# Patient Record
Sex: MALE | Race: BLACK OR AFRICAN AMERICAN | NOT HISPANIC OR LATINO | Employment: UNEMPLOYED | URBAN - METROPOLITAN AREA
[De-identification: names, ages, dates, MRNs, and addresses within clinical notes are randomized per-mention and may not be internally consistent; named-entity substitution may affect disease eponyms.]

---

## 2024-04-19 ENCOUNTER — HOSPITAL ENCOUNTER (EMERGENCY)
Facility: HOSPITAL | Age: 74
Discharge: HOME | End: 2024-04-20
Attending: EMERGENCY MEDICINE
Payer: COMMERCIAL

## 2024-04-19 ENCOUNTER — APPOINTMENT (OUTPATIENT)
Dept: CARDIOLOGY | Facility: HOSPITAL | Age: 74
End: 2024-04-19
Payer: COMMERCIAL

## 2024-04-19 ENCOUNTER — APPOINTMENT (OUTPATIENT)
Dept: RADIOLOGY | Facility: HOSPITAL | Age: 74
End: 2024-04-19
Payer: COMMERCIAL

## 2024-04-19 VITALS
SYSTOLIC BLOOD PRESSURE: 153 MMHG | HEIGHT: 68 IN | DIASTOLIC BLOOD PRESSURE: 73 MMHG | RESPIRATION RATE: 16 BRPM | WEIGHT: 180.56 LBS | BODY MASS INDEX: 27.36 KG/M2 | HEART RATE: 78 BPM | OXYGEN SATURATION: 98 % | TEMPERATURE: 97.7 F

## 2024-04-19 DIAGNOSIS — E86.0 DEHYDRATION: Primary | ICD-10-CM

## 2024-04-19 DIAGNOSIS — B34.9 VIRAL SYNDROME: ICD-10-CM

## 2024-04-19 LAB
ALBUMIN SERPL-MCNC: 4.2 G/DL (ref 3.5–5)
ALP BLD-CCNC: 115 U/L (ref 35–125)
ALT SERPL-CCNC: 17 U/L (ref 5–40)
ANION GAP SERPL CALC-SCNC: 10 MMOL/L
APPEARANCE UR: CLEAR
AST SERPL-CCNC: 18 U/L (ref 5–40)
BASOPHILS # BLD AUTO: 0.03 X10*3/UL (ref 0–0.1)
BASOPHILS NFR BLD AUTO: 0.5 %
BILIRUB SERPL-MCNC: 0.6 MG/DL (ref 0.1–1.2)
BILIRUB UR STRIP.AUTO-MCNC: NEGATIVE MG/DL
BUN SERPL-MCNC: 31 MG/DL (ref 8–25)
CALCIUM SERPL-MCNC: 10.2 MG/DL (ref 8.5–10.4)
CHLORIDE SERPL-SCNC: 103 MMOL/L (ref 97–107)
CO2 SERPL-SCNC: 24 MMOL/L (ref 24–31)
COLOR UR: COLORLESS
CREAT SERPL-MCNC: 2.3 MG/DL (ref 0.4–1.6)
EGFRCR SERPLBLD CKD-EPI 2021: 29 ML/MIN/1.73M*2
EOSINOPHIL # BLD AUTO: 0.41 X10*3/UL (ref 0–0.4)
EOSINOPHIL NFR BLD AUTO: 6.8 %
ERYTHROCYTE [DISTWIDTH] IN BLOOD BY AUTOMATED COUNT: 12.6 % (ref 11.5–14.5)
FLUAV RNA RESP QL NAA+PROBE: NOT DETECTED
FLUBV RNA RESP QL NAA+PROBE: NOT DETECTED
GLUCOSE SERPL-MCNC: 245 MG/DL (ref 65–99)
GLUCOSE UR STRIP.AUTO-MCNC: ABNORMAL MG/DL
HCT VFR BLD AUTO: 45.1 % (ref 41–52)
HGB BLD-MCNC: 15.5 G/DL (ref 13.5–17.5)
IMM GRANULOCYTES # BLD AUTO: 0.01 X10*3/UL (ref 0–0.5)
IMM GRANULOCYTES NFR BLD AUTO: 0.2 % (ref 0–0.9)
KETONES UR STRIP.AUTO-MCNC: NEGATIVE MG/DL
LEUKOCYTE ESTERASE UR QL STRIP.AUTO: NEGATIVE
LYMPHOCYTES # BLD AUTO: 1.38 X10*3/UL (ref 0.8–3)
LYMPHOCYTES NFR BLD AUTO: 23 %
MCH RBC QN AUTO: 31.4 PG (ref 26–34)
MCHC RBC AUTO-ENTMCNC: 34.4 G/DL (ref 32–36)
MCV RBC AUTO: 91 FL (ref 80–100)
MONOCYTES # BLD AUTO: 0.92 X10*3/UL (ref 0.05–0.8)
MONOCYTES NFR BLD AUTO: 15.3 %
NEUTROPHILS # BLD AUTO: 3.26 X10*3/UL (ref 1.6–5.5)
NEUTROPHILS NFR BLD AUTO: 54.2 %
NITRITE UR QL STRIP.AUTO: NEGATIVE
NRBC BLD-RTO: 0 /100 WBCS (ref 0–0)
PH UR STRIP.AUTO: 6.5 [PH]
PLATELET # BLD AUTO: 153 X10*3/UL (ref 150–450)
POTASSIUM SERPL-SCNC: 4.5 MMOL/L (ref 3.4–5.1)
PROT SERPL-MCNC: 8.3 G/DL (ref 5.9–7.9)
PROT UR STRIP.AUTO-MCNC: NEGATIVE MG/DL
RBC # BLD AUTO: 4.94 X10*6/UL (ref 4.5–5.9)
RBC # UR STRIP.AUTO: NEGATIVE /UL
SARS-COV-2 RNA RESP QL NAA+PROBE: NOT DETECTED
SODIUM SERPL-SCNC: 137 MMOL/L (ref 133–145)
SP GR UR STRIP.AUTO: 1.01
UROBILINOGEN UR STRIP.AUTO-MCNC: NORMAL MG/DL
WBC # BLD AUTO: 6 X10*3/UL (ref 4.4–11.3)

## 2024-04-19 PROCEDURE — 93005 ELECTROCARDIOGRAM TRACING: CPT

## 2024-04-19 PROCEDURE — 71046 X-RAY EXAM CHEST 2 VIEWS: CPT | Performed by: RADIOLOGY

## 2024-04-19 PROCEDURE — 96374 THER/PROPH/DIAG INJ IV PUSH: CPT

## 2024-04-19 PROCEDURE — 96361 HYDRATE IV INFUSION ADD-ON: CPT

## 2024-04-19 PROCEDURE — 87636 SARSCOV2 & INF A&B AMP PRB: CPT | Performed by: PHYSICIAN ASSISTANT

## 2024-04-19 PROCEDURE — 81003 URINALYSIS AUTO W/O SCOPE: CPT | Performed by: PHYSICIAN ASSISTANT

## 2024-04-19 PROCEDURE — 36415 COLL VENOUS BLD VENIPUNCTURE: CPT | Performed by: PHYSICIAN ASSISTANT

## 2024-04-19 PROCEDURE — 71046 X-RAY EXAM CHEST 2 VIEWS: CPT

## 2024-04-19 PROCEDURE — 85025 COMPLETE CBC W/AUTO DIFF WBC: CPT | Performed by: PHYSICIAN ASSISTANT

## 2024-04-19 PROCEDURE — 99284 EMERGENCY DEPT VISIT MOD MDM: CPT | Mod: 25

## 2024-04-19 PROCEDURE — 84075 ASSAY ALKALINE PHOSPHATASE: CPT | Performed by: PHYSICIAN ASSISTANT

## 2024-04-19 PROCEDURE — 2500000004 HC RX 250 GENERAL PHARMACY W/ HCPCS (ALT 636 FOR OP/ED): Performed by: PHYSICIAN ASSISTANT

## 2024-04-19 RX ORDER — ACETAMINOPHEN 325 MG/1
975 TABLET ORAL ONCE
Status: COMPLETED | OUTPATIENT
Start: 2024-04-19 | End: 2024-04-19

## 2024-04-19 RX ORDER — KETOROLAC TROMETHAMINE 30 MG/ML
15 INJECTION, SOLUTION INTRAMUSCULAR; INTRAVENOUS ONCE
Status: COMPLETED | OUTPATIENT
Start: 2024-04-19 | End: 2024-04-19

## 2024-04-19 RX ADMIN — KETOROLAC TROMETHAMINE 15 MG: 30 INJECTION, SOLUTION INTRAMUSCULAR at 20:50

## 2024-04-19 RX ADMIN — SODIUM CHLORIDE 500 ML: 900 INJECTION, SOLUTION INTRAVENOUS at 20:50

## 2024-04-19 RX ADMIN — ACETAMINOPHEN 975 MG: 325 TABLET ORAL at 20:50

## 2024-04-19 ASSESSMENT — COLUMBIA-SUICIDE SEVERITY RATING SCALE - C-SSRS
6. HAVE YOU EVER DONE ANYTHING, STARTED TO DO ANYTHING, OR PREPARED TO DO ANYTHING TO END YOUR LIFE?: NO
2. HAVE YOU ACTUALLY HAD ANY THOUGHTS OF KILLING YOURSELF?: NO
1. IN THE PAST MONTH, HAVE YOU WISHED YOU WERE DEAD OR WISHED YOU COULD GO TO SLEEP AND NOT WAKE UP?: NO

## 2024-04-19 ASSESSMENT — PAIN - FUNCTIONAL ASSESSMENT: PAIN_FUNCTIONAL_ASSESSMENT: 0-10

## 2024-04-19 ASSESSMENT — PAIN SCALES - GENERAL: PAINLEVEL_OUTOF10: 0 - NO PAIN

## 2024-04-20 LAB — HOLD SPECIMEN: NORMAL

## 2024-04-20 NOTE — DISCHARGE INSTRUCTIONS
Continue with plenty of fluids at home.  Follow-up with your primary care physician as soon as possible for repeat lab work.  Return to the ER for new or worsening symptoms

## 2024-04-20 NOTE — ED PROVIDER NOTES
HPI   Chief Complaint   Patient presents with    Flu Symptoms     Congestion, fatigue, sore throat, chills x1 week. Aox4, stable, vitals wnr. Pt recently switched meds fr4om metoprolol to carvedilol.        73-year-old male with history of hypertension diabetes chronic kidney disease presented emergency department with a chief complaint of a generalized weakness, sore throat, chills, body aches, mild cough that has been for the last week.  He had his metoprolol switched to carvedilol several weeks ago and thinks this may be related although his symptoms started a week or so after the change.  He denies chest pain or pressure.  He is well-appearing nontoxic.  He denies lightheadedness dizziness numbness weakness.  He denies fall or injury or trauma.  No other complaint.                          No data recorded                   Patient History   No past medical history on file.  No past surgical history on file.  No family history on file.  Social History     Tobacco Use    Smoking status: Not on file    Smokeless tobacco: Not on file   Substance Use Topics    Alcohol use: Not on file    Drug use: Not on file       Physical Exam   ED Triage Vitals [04/19/24 2036]   Temperature Heart Rate Respirations BP   36.5 °C (97.7 °F) 78 16 153/73      Pulse Ox Temp src Heart Rate Source Patient Position   98 % -- -- --      BP Location FiO2 (%)     -- --       Physical Exam  Vitals and nursing note reviewed.   Constitutional:       Appearance: Normal appearance.   HENT:      Head: Normocephalic.      Nose: Nose normal.      Mouth/Throat:      Mouth: Mucous membranes are moist.   Cardiovascular:      Rate and Rhythm: Normal rate and regular rhythm.   Pulmonary:      Effort: Pulmonary effort is normal.   Abdominal:      General: Abdomen is flat.      Palpations: Abdomen is soft.   Musculoskeletal:         General: Normal range of motion.      Cervical back: Normal range of motion.   Skin:     General: Skin is warm.    Neurological:      General: No focal deficit present.      Mental Status: He is alert and oriented to person, place, and time.   Psychiatric:         Mood and Affect: Mood normal.         Behavior: Behavior normal.         ED Course & MDM   ED Course as of 04/19/24 2211 Fri Apr 19, 2024 2106 EKG interpretation: Normal sinus rhythm, rate 75.  Normal axis.  No ST or T wave abnormalities. [ML]   2140 Blood Urea Nitrogen(!): 31 [JH]   2143 Creatinine(!): 2.30 [JH]      ED Course User Index  [JH] Lars Khan PA-C  [ML] Larry Barakat MD         Diagnoses as of 04/19/24 2211   Dehydration   Viral syndrome       Medical Decision Making  I have seen and evaluated this patient.  The attending physician has also seen and evaluated this patient.  Vital signs, laboratory testing and diagnostic images if applicable have been reviewed.  All laboratory and imaging is interpreted by myself unless otherwise stated.  Radiology studies are also formally interpreted by radiologist.        Labs Reviewed   CBC WITH AUTO DIFFERENTIAL - Abnormal       Result Value    WBC 6.0      nRBC 0.0      RBC 4.94      Hemoglobin 15.5      Hematocrit 45.1      MCV 91      MCH 31.4      MCHC 34.4      RDW 12.6      Platelets 153      Neutrophils % 54.2      Immature Granulocytes %, Automated 0.2      Lymphocytes % 23.0      Monocytes % 15.3      Eosinophils % 6.8      Basophils % 0.5      Neutrophils Absolute 3.26      Immature Granulocytes Absolute, Automated 0.01      Lymphocytes Absolute 1.38      Monocytes Absolute 0.92 (*)     Eosinophils Absolute 0.41 (*)     Basophils Absolute 0.03     COMPREHENSIVE METABOLIC PANEL - Abnormal    Glucose 245 (*)     Sodium 137      Potassium 4.5      Chloride 103      Bicarbonate 24      Urea Nitrogen 31 (*)     Creatinine 2.30 (*)     eGFR 29 (*)     Calcium 10.2      Albumin 4.2      Alkaline Phosphatase 115      Total Protein 8.3 (*)     AST 18      Bilirubin, Total 0.6      ALT 17      Anion Gap 10      SARS-COV-2 AND INFLUENZA A/B PCR - Normal    Flu A Result Not Detected      Flu B Result Not Detected      Coronavirus 2019, PCR Not Detected      Narrative:     This assay has received FDA Emergency Use Authorization (EUA) and  is only authorized for the duration of time that circumstances exist to justify the authorization of the emergency use of in vitro diagnostic tests for the detection of SARS-CoV-2 virus and/or diagnosis of COVID-19 infection under section 564(b)(1) of the Act, 21 U.S.C. 360bbb-3(b)(1). Testing for SARS-CoV-2 is only recommended for patients who meet current clinical and/or epidemiological criteria as defined by federal, state, or local public health directives. This assay is an in vitro diagnostic nucleic acid amplification test for the qualitative detection of SARS-CoV-2, Influenza A, and Influenza B from nasopharyngeal specimens and has been validated for use at [facility]. Negative results do not preclude COVID-19 infections or Influenza A/B infections, and should not be used as the sole basis for diagnosis, treatment, or other management decisions. If Influenza A/B and RSV PCR results are negative, testing for Parainfluenza virus, Adenovirus and Metapneumovirus is routinely performed for [facility] pediatric oncology and intensive care inpatients, and is available on other patients by placing an add-on request.    URINALYSIS WITH REFLEX CULTURE AND MICROSCOPIC    Narrative:     The following orders were created for panel order Urinalysis with Reflex Culture and Microscopic.  Procedure                               Abnormality         Status                     ---------                               -----------         ------                     Urinalysis with Reflex C...[[id number]]                                                 Extra Urine Gray Tube[[id number]]                                                         Please view results for these tests on the  individual orders.   URINALYSIS WITH REFLEX CULTURE AND MICROSCOPIC   EXTRA URINE GRAY TUBE     XR chest 2 views   Final Result   1. No acute cardiopulmonary abnormality.             Signed by: Donaldo Burton 4/19/2024 9:37 PM   Dictation workstation:   [id number]        Medications   sodium chloride 0.9 % bolus 500 mL (500 mL intravenous New Bag 4/19/24 2050)   acetaminophen (Tylenol) tablet 975 mg (975 mg oral Given 4/19/24 2050)   ketorolac (Toradol) injection 15 mg (15 mg intravenous Given 4/19/24 2050)     New Prescriptions    No medications on file         Procedure  Procedures     Lars Khan PA-C  04/19/24 0681

## 2024-06-19 LAB
ATRIAL RATE: 75 BPM
P AXIS: 49 DEGREES
P OFFSET: 193 MS
P ONSET: 139 MS
PR INTERVAL: 152 MS
Q ONSET: 215 MS
QRS COUNT: 12 BEATS
QRS DURATION: 98 MS
QT INTERVAL: 386 MS
QTC CALCULATION(BAZETT): 431 MS
QTC FREDERICIA: 415 MS
R AXIS: -19 DEGREES
T AXIS: 18 DEGREES
T OFFSET: 408 MS
VENTRICULAR RATE: 75 BPM

## 2025-03-12 ENCOUNTER — HOSPITAL ENCOUNTER (EMERGENCY)
Facility: HOSPITAL | Age: 75
Discharge: HOME | End: 2025-03-12
Payer: COMMERCIAL

## 2025-03-12 ENCOUNTER — APPOINTMENT (OUTPATIENT)
Dept: RADIOLOGY | Facility: HOSPITAL | Age: 75
End: 2025-03-12
Payer: COMMERCIAL

## 2025-03-12 ENCOUNTER — APPOINTMENT (OUTPATIENT)
Dept: CARDIOLOGY | Facility: HOSPITAL | Age: 75
End: 2025-03-12
Payer: COMMERCIAL

## 2025-03-12 VITALS
HEIGHT: 68 IN | WEIGHT: 173.72 LBS | TEMPERATURE: 98.1 F | HEART RATE: 74 BPM | DIASTOLIC BLOOD PRESSURE: 80 MMHG | BODY MASS INDEX: 26.33 KG/M2 | OXYGEN SATURATION: 98 % | RESPIRATION RATE: 16 BRPM | SYSTOLIC BLOOD PRESSURE: 157 MMHG

## 2025-03-12 DIAGNOSIS — I35.1 AORTIC EJECTION MURMUR: ICD-10-CM

## 2025-03-12 DIAGNOSIS — R53.81 MALAISE AND FATIGUE: ICD-10-CM

## 2025-03-12 DIAGNOSIS — R68.83 CHILLS: Primary | ICD-10-CM

## 2025-03-12 DIAGNOSIS — R53.83 MALAISE AND FATIGUE: ICD-10-CM

## 2025-03-12 LAB
ALBUMIN SERPL BCP-MCNC: 4.3 G/DL (ref 3.4–5)
ALP SERPL-CCNC: 88 U/L (ref 33–136)
ALT SERPL W P-5'-P-CCNC: 17 U/L (ref 10–52)
ANION GAP SERPL CALCULATED.3IONS-SCNC: 10 MMOL/L (ref 10–20)
APPEARANCE UR: CLEAR
AST SERPL W P-5'-P-CCNC: 12 U/L (ref 9–39)
BASOPHILS # BLD AUTO: 0.03 X10*3/UL (ref 0–0.1)
BASOPHILS NFR BLD AUTO: 0.4 %
BILIRUB SERPL-MCNC: 0.8 MG/DL (ref 0–1.2)
BILIRUB UR STRIP.AUTO-MCNC: NEGATIVE MG/DL
BUN SERPL-MCNC: 23 MG/DL (ref 6–23)
CALCIUM SERPL-MCNC: 10.8 MG/DL (ref 8.6–10.3)
CHLORIDE SERPL-SCNC: 105 MMOL/L (ref 98–107)
CO2 SERPL-SCNC: 28 MMOL/L (ref 21–32)
COLOR UR: ABNORMAL
CREAT SERPL-MCNC: 1.69 MG/DL (ref 0.5–1.3)
EGFRCR SERPLBLD CKD-EPI 2021: 42 ML/MIN/1.73M*2
EOSINOPHIL # BLD AUTO: 0.29 X10*3/UL (ref 0–0.4)
EOSINOPHIL NFR BLD AUTO: 4.2 %
ERYTHROCYTE [DISTWIDTH] IN BLOOD BY AUTOMATED COUNT: 12.3 % (ref 11.5–14.5)
FLUAV RNA RESP QL NAA+PROBE: NOT DETECTED
FLUBV RNA RESP QL NAA+PROBE: NOT DETECTED
GLUCOSE SERPL-MCNC: 143 MG/DL (ref 74–99)
GLUCOSE UR STRIP.AUTO-MCNC: ABNORMAL MG/DL
HCT VFR BLD AUTO: 43 % (ref 41–52)
HGB BLD-MCNC: 15 G/DL (ref 13.5–17.5)
IMM GRANULOCYTES # BLD AUTO: 0.02 X10*3/UL (ref 0–0.5)
IMM GRANULOCYTES NFR BLD AUTO: 0.3 % (ref 0–0.9)
KETONES UR STRIP.AUTO-MCNC: NEGATIVE MG/DL
LEUKOCYTE ESTERASE UR QL STRIP.AUTO: NEGATIVE
LYMPHOCYTES # BLD AUTO: 1.77 X10*3/UL (ref 0.8–3)
LYMPHOCYTES NFR BLD AUTO: 25.5 %
MCH RBC QN AUTO: 31.9 PG (ref 26–34)
MCHC RBC AUTO-ENTMCNC: 34.9 G/DL (ref 32–36)
MCV RBC AUTO: 92 FL (ref 80–100)
MONOCYTES # BLD AUTO: 0.69 X10*3/UL (ref 0.05–0.8)
MONOCYTES NFR BLD AUTO: 10 %
NEUTROPHILS # BLD AUTO: 4.13 X10*3/UL (ref 1.6–5.5)
NEUTROPHILS NFR BLD AUTO: 59.6 %
NITRITE UR QL STRIP.AUTO: NEGATIVE
NRBC BLD-RTO: 0 /100 WBCS (ref 0–0)
PH UR STRIP.AUTO: 6.5 [PH]
PLATELET # BLD AUTO: 168 X10*3/UL (ref 150–450)
POTASSIUM SERPL-SCNC: 3.8 MMOL/L (ref 3.5–5.3)
PROT SERPL-MCNC: 8.2 G/DL (ref 6.4–8.2)
PROT UR STRIP.AUTO-MCNC: ABNORMAL MG/DL
RBC # BLD AUTO: 4.7 X10*6/UL (ref 4.5–5.9)
RBC # UR STRIP.AUTO: NEGATIVE MG/DL
RBC #/AREA URNS AUTO: NORMAL /HPF
RSV RNA RESP QL NAA+PROBE: NOT DETECTED
SARS-COV-2 RNA RESP QL NAA+PROBE: NOT DETECTED
SODIUM SERPL-SCNC: 139 MMOL/L (ref 136–145)
SP GR UR STRIP.AUTO: 1.02
UROBILINOGEN UR STRIP.AUTO-MCNC: NORMAL MG/DL
WBC # BLD AUTO: 6.9 X10*3/UL (ref 4.4–11.3)
WBC #/AREA URNS AUTO: NORMAL /HPF

## 2025-03-12 PROCEDURE — 85025 COMPLETE CBC W/AUTO DIFF WBC: CPT | Performed by: PHYSICIAN ASSISTANT

## 2025-03-12 PROCEDURE — 81001 URINALYSIS AUTO W/SCOPE: CPT | Performed by: PHYSICIAN ASSISTANT

## 2025-03-12 PROCEDURE — 93005 ELECTROCARDIOGRAM TRACING: CPT

## 2025-03-12 PROCEDURE — 99285 EMERGENCY DEPT VISIT HI MDM: CPT | Mod: 25

## 2025-03-12 PROCEDURE — 71046 X-RAY EXAM CHEST 2 VIEWS: CPT

## 2025-03-12 PROCEDURE — 71046 X-RAY EXAM CHEST 2 VIEWS: CPT | Performed by: SURGERY

## 2025-03-12 PROCEDURE — 87637 SARSCOV2&INF A&B&RSV AMP PRB: CPT | Performed by: PHYSICIAN ASSISTANT

## 2025-03-12 PROCEDURE — 80053 COMPREHEN METABOLIC PANEL: CPT | Performed by: PHYSICIAN ASSISTANT

## 2025-03-12 PROCEDURE — 36415 COLL VENOUS BLD VENIPUNCTURE: CPT | Performed by: PHYSICIAN ASSISTANT

## 2025-03-12 ASSESSMENT — PAIN SCALES - GENERAL: PAINLEVEL_OUTOF10: 0 - NO PAIN

## 2025-03-12 ASSESSMENT — COLUMBIA-SUICIDE SEVERITY RATING SCALE - C-SSRS
2. HAVE YOU ACTUALLY HAD ANY THOUGHTS OF KILLING YOURSELF?: NO
6. HAVE YOU EVER DONE ANYTHING, STARTED TO DO ANYTHING, OR PREPARED TO DO ANYTHING TO END YOUR LIFE?: NO
1. IN THE PAST MONTH, HAVE YOU WISHED YOU WERE DEAD OR WISHED YOU COULD GO TO SLEEP AND NOT WAKE UP?: NO

## 2025-03-12 ASSESSMENT — PAIN - FUNCTIONAL ASSESSMENT: PAIN_FUNCTIONAL_ASSESSMENT: 0-10

## 2025-03-13 LAB
ATRIAL RATE: 68 BPM
P AXIS: 49 DEGREES
P OFFSET: 198 MS
P ONSET: 140 MS
PR INTERVAL: 148 MS
Q ONSET: 214 MS
QRS COUNT: 12 BEATS
QRS DURATION: 84 MS
QT INTERVAL: 396 MS
QTC CALCULATION(BAZETT): 421 MS
QTC FREDERICIA: 413 MS
R AXIS: -18 DEGREES
T AXIS: 23 DEGREES
T OFFSET: 412 MS
VENTRICULAR RATE: 68 BPM

## 2025-03-13 NOTE — ED PROVIDER NOTES
HPI   Chief Complaint   Patient presents with   • Chills     Pt complains of intermittent chills, HTN, sore throat for a week.  Possible covid exposure.       74-year-old male presenting emergency department with a chief complaint of hypertension, feeling chilled over the last day.  He denies chest pain shortness of breath.  He is well-appearing nontoxic afebrile.  Denies lightheadedness dizziness numbness weakness.  Denies fall or injury or trauma.  No other complaint.              Patient History   No past medical history on file.  No past surgical history on file.  No family history on file.  Social History     Tobacco Use   • Smoking status: Not on file   • Smokeless tobacco: Not on file   Substance Use Topics   • Alcohol use: Not on file   • Drug use: Not on file       Physical Exam   ED Triage Vitals [03/12/25 1903]   Temperature Heart Rate Respirations BP   36.7 °C (98.1 °F) 74 16 157/80      Pulse Ox Temp src Heart Rate Source Patient Position   98 % -- -- --      BP Location FiO2 (%)     -- --       Physical Exam  Vitals and nursing note reviewed.   Constitutional:       Appearance: Normal appearance.   HENT:      Head: Normocephalic.      Nose: Nose normal.      Mouth/Throat:      Mouth: Mucous membranes are moist.   Cardiovascular:      Rate and Rhythm: Normal rate and regular rhythm.      Pulses: Normal pulses.      Heart sounds: Normal heart sounds.      Comments: Systolic murmur  Pulmonary:      Effort: Pulmonary effort is normal.      Breath sounds: Normal breath sounds.   Abdominal:      General: Abdomen is flat.      Palpations: Abdomen is soft.   Musculoskeletal:         General: Normal range of motion.      Cervical back: Normal range of motion.   Skin:     General: Skin is warm.   Neurological:      General: No focal deficit present.      Mental Status: He is alert and oriented to person, place, and time.   Psychiatric:         Mood and Affect: Mood normal.           ED Course & MDM   ED Course  as of 03/12/25 2323   Wed Mar 12, 2025   1945 EKG on my independent interpretation: Normal sinus rhythm 68 bpm, normal axis, normal intervals, no acute ST or T wave abnormalities [JUAN FRANCISCO]      ED Course User Index  [JUAN FRANCISCO] Christen Wilson MD         Diagnoses as of 03/12/25 2323   Chills   Malaise and fatigue   Aortic ejection murmur                 No data recorded     Angélica Coma Scale Score: 15 (03/12/25 1937 : Gideon Hays RN)                           Medical Decision Making  I have seen and evaluated this patient.  Physician available for consultation.  Vital signs have been reviewed.  All laboratory and diagnostic imaging is reviewed by myself and interpreted by myself unless otherwise stated.  Additionally imaging is interpreted by radiologist.    CBC without significant leukocytosis or anemia, metabolic panel without significant renal impairment or electrolyte abnormality.  Urinalysis negative, viral testing negative.  Patient noted hypertension but otherwise normal vitals.  Released in stable condition for outpatient follow-up.          Labs Reviewed   COMPREHENSIVE METABOLIC PANEL - Abnormal       Result Value    Glucose 143 (*)     Sodium 139      Potassium 3.8      Chloride 105      Bicarbonate 28      Anion Gap 10      Urea Nitrogen 23      Creatinine 1.69 (*)     eGFR 42 (*)     Calcium 10.8 (*)     Albumin 4.3      Alkaline Phosphatase 88      Total Protein 8.2      AST 12      Bilirubin, Total 0.8      ALT 17     URINALYSIS WITH REFLEX CULTURE AND MICROSCOPIC - Abnormal    Color, Urine Light-Yellow      Appearance, Urine Clear      Specific Gravity, Urine 1.021      pH, Urine 6.5      Protein, Urine 10 (TRACE)      Glucose, Urine OVER (4+) (*)     Blood, Urine NEGATIVE      Ketones, Urine NEGATIVE      Bilirubin, Urine NEGATIVE      Urobilinogen, Urine Normal      Nitrite, Urine NEGATIVE      Leukocyte Esterase, Urine NEGATIVE      Narrative:     OVER is reported when the result is greater than  the clinically reportable range.   SARS-COV-2, INFLUENZA A/B AND RSV PCR - Normal    Coronavirus 2019, PCR Not Detected      Flu A Result Not Detected      Flu B Result Not Detected      RSV PCR Not Detected      Narrative:     This assay is an FDA-cleared, in vitro diagnostic nucleic acid amplification test for the qualitative detection and differentiation of SARS CoV-2/ Influenza A/B/ RSV from nasopharyngeal specimens collected from individuals with signs and symptoms of respiratory tract infections, and has been validated for use at [facility]. Negative results do not preclude COVID-19/ Influenza A/B/ RSV infections and should not be used as the sole basis for diagnosis, treatment, or other management decisions. Testing for SARS CoV-2 is recommended only for patients who meet current clinical and/or epidemiological criteria defined by federal, state, or local public health directives.   URINALYSIS MICROSCOPIC WITH REFLEX CULTURE - Normal    WBC, Urine NONE      RBC, Urine NONE     CBC WITH AUTO DIFFERENTIAL    WBC 6.9      nRBC 0.0      RBC 4.70      Hemoglobin 15.0      Hematocrit 43.0      MCV 92      MCH 31.9      MCHC 34.9      RDW 12.3      Platelets 168      Neutrophils % 59.6      Immature Granulocytes %, Automated 0.3      Lymphocytes % 25.5      Monocytes % 10.0      Eosinophils % 4.2      Basophils % 0.4      Neutrophils Absolute 4.13      Immature Granulocytes Absolute, Automated 0.02      Lymphocytes Absolute 1.77      Monocytes Absolute 0.69      Eosinophils Absolute 0.29      Basophils Absolute 0.03     URINALYSIS WITH REFLEX CULTURE AND MICROSCOPIC    Narrative:     The following orders were created for panel order Urinalysis with Reflex Culture and Microscopic.  Procedure                               Abnormality         Status                     ---------                               -----------         ------                     Urinalysis with Reflex C...[[id number]]   Abnormal            Final result               Extra Urine Gray Tube[[id number]]                                                         Please view results for these tests on the individual orders.   EXTRA URINE GRAY TUBE     XR chest 2 views   Final Result   1.  No evidence of acute cardiopulmonary process.                  MACRO:   None        Signed by: Ludwin Carter 3/12/2025 8:18 PM   Dictation workstation:   [id number]        Medications - No data to display  New Prescriptions    No medications on file         Procedure  Procedures     Lars Khan PA-C  03/12/25 4749

## 2025-04-22 PROBLEM — E78.2 MIXED HYPERLIPIDEMIA: Status: ACTIVE | Noted: 2025-04-22

## 2025-04-22 PROBLEM — I10 PRIMARY HYPERTENSION: Status: ACTIVE | Noted: 2025-04-22

## 2025-04-22 PROBLEM — R01.1 MURMUR, HEART: Status: ACTIVE | Noted: 2025-04-22

## 2025-04-22 NOTE — ASSESSMENT & PLAN NOTE
LDL 66 on lipid panel done in March 2024.  Reasonable reduction in LDL cholesterol with low-dose atorvastatin at 10 mg daily.  No change necessary.

## 2025-04-24 ENCOUNTER — OFFICE VISIT (OUTPATIENT)
Facility: CLINIC | Age: 75
End: 2025-04-24
Payer: COMMERCIAL

## 2025-04-24 VITALS
DIASTOLIC BLOOD PRESSURE: 70 MMHG | BODY MASS INDEX: 26.91 KG/M2 | WEIGHT: 177 LBS | SYSTOLIC BLOOD PRESSURE: 136 MMHG | OXYGEN SATURATION: 98 % | HEART RATE: 69 BPM

## 2025-04-24 DIAGNOSIS — E78.2 MIXED HYPERLIPIDEMIA: ICD-10-CM

## 2025-04-24 DIAGNOSIS — R01.1 MURMUR, HEART: Primary | ICD-10-CM

## 2025-04-24 DIAGNOSIS — I10 PRIMARY HYPERTENSION: ICD-10-CM

## 2025-04-24 PROCEDURE — 3078F DIAST BP <80 MM HG: CPT | Performed by: INTERNAL MEDICINE

## 2025-04-24 PROCEDURE — 1036F TOBACCO NON-USER: CPT | Performed by: INTERNAL MEDICINE

## 2025-04-24 PROCEDURE — 99213 OFFICE O/P EST LOW 20 MIN: CPT | Performed by: INTERNAL MEDICINE

## 2025-04-24 PROCEDURE — 99203 OFFICE O/P NEW LOW 30 MIN: CPT | Performed by: INTERNAL MEDICINE

## 2025-04-24 PROCEDURE — 1126F AMNT PAIN NOTED NONE PRSNT: CPT | Performed by: INTERNAL MEDICINE

## 2025-04-24 PROCEDURE — 3075F SYST BP GE 130 - 139MM HG: CPT | Performed by: INTERNAL MEDICINE

## 2025-04-24 PROCEDURE — 1159F MED LIST DOCD IN RCRD: CPT | Performed by: INTERNAL MEDICINE

## 2025-04-24 RX ORDER — PREDNISOLONE ACETATE 10 MG/ML
1 SUSPENSION/ DROPS OPHTHALMIC 3 TIMES DAILY
COMMUNITY
Start: 2024-06-05

## 2025-04-24 RX ORDER — GLIPIZIDE 10 MG/1
10 TABLET, FILM COATED, EXTENDED RELEASE ORAL
COMMUNITY
Start: 2025-03-24

## 2025-04-24 RX ORDER — MOXIFLOXACIN 5 MG/ML
SOLUTION/ DROPS OPHTHALMIC
COMMUNITY
Start: 2024-07-01

## 2025-04-24 RX ORDER — AMLODIPINE BESYLATE 10 MG/1
1 TABLET ORAL DAILY
COMMUNITY
Start: 2025-02-10

## 2025-04-24 RX ORDER — ACETAZOLAMIDE 250 MG/1
250 TABLET ORAL SEE ADMIN INSTRUCTIONS
COMMUNITY
Start: 2024-06-05 | End: 2025-04-24

## 2025-04-24 RX ORDER — LANCETS 30 GAUGE
EACH MISCELLANEOUS
COMMUNITY
Start: 2024-06-28

## 2025-04-24 RX ORDER — LOSARTAN POTASSIUM 100 MG/1
100 TABLET ORAL DAILY
COMMUNITY

## 2025-04-24 RX ORDER — DIAZEPAM 2 MG/1
2 TABLET ORAL EVERY 6 HOURS PRN
COMMUNITY

## 2025-04-24 RX ORDER — DICLOFENAC SODIUM 1 MG/ML
1 SOLUTION/ DROPS OPHTHALMIC NIGHTLY
COMMUNITY
Start: 2024-06-05 | End: 2025-04-24

## 2025-04-24 RX ORDER — CARVEDILOL 25 MG/1
25 TABLET ORAL 2 TIMES DAILY
COMMUNITY
Start: 2025-03-24 | End: 2026-03-24

## 2025-04-24 RX ORDER — ASPIRIN 81 MG/1
81 TABLET ORAL DAILY
COMMUNITY

## 2025-04-24 RX ORDER — LATANOPROST 50 UG/ML
1 SOLUTION/ DROPS OPHTHALMIC NIGHTLY
COMMUNITY
Start: 2025-04-10 | End: 2025-07-09

## 2025-04-24 RX ORDER — BLOOD SUGAR DIAGNOSTIC
STRIP MISCELLANEOUS
COMMUNITY

## 2025-04-24 RX ORDER — ATORVASTATIN CALCIUM 10 MG/1
10 TABLET, FILM COATED ORAL DAILY
COMMUNITY

## 2025-04-24 RX ORDER — SILDENAFIL CITRATE 20 MG/1
20 TABLET ORAL AS NEEDED
COMMUNITY
Start: 2025-03-25

## 2025-04-24 RX ORDER — EMPAGLIFLOZIN 25 MG/1
25 TABLET, FILM COATED ORAL
COMMUNITY
Start: 2025-03-31

## 2025-04-24 ASSESSMENT — LIFESTYLE VARIABLES: TOTAL SCORE: 0

## 2025-04-24 ASSESSMENT — PATIENT HEALTH QUESTIONNAIRE - PHQ9
1. LITTLE INTEREST OR PLEASURE IN DOING THINGS: NOT AT ALL
2. FEELING DOWN, DEPRESSED OR HOPELESS: NOT AT ALL
SUM OF ALL RESPONSES TO PHQ9 QUESTIONS 1 AND 2: 0

## 2025-04-24 ASSESSMENT — ENCOUNTER SYMPTOMS
DEPRESSION: 0
LOSS OF SENSATION IN FEET: 0
OCCASIONAL FEELINGS OF UNSTEADINESS: 0

## 2025-04-24 ASSESSMENT — PAIN SCALES - GENERAL: PAINLEVEL_OUTOF10: 0-NO PAIN

## 2025-04-24 NOTE — ASSESSMENT & PLAN NOTE
Patient with a very low-grade murmur on physical exam.  I will order an echocardiogram to determine the etiology but clinical suspicion is low for significant valvular issue at this point.  Plan will be for the patient to call me the day after the echocardiogram and we will review the results over the phone.

## 2025-04-24 NOTE — PROGRESS NOTES
Referred by Dr. Couch ref. provider found for Ref by ER for heart murmur     History Of Present Illness:    Laith Marquez is a 74 y.o. male presenting with heart murmur.  This patient has a history of hypertension and hyperlipidemia and reported to the [facility] emergency department with chills.  He states that he was told that he had a heart murmur on his physical exam and should see a cardiologist.  He describes no chest pain or anginal symptoms.  No unusual shortness of breath.  He had no cardiac issues in the past.  I thought he should follow-up as recommended by the emergency department staff.      Past Medical History:  He has a past medical history of Type 2 diabetes mellitus.  Hypertension  Hyperlipidemia    Past Surgical History:  He has a past surgical history that includes Cataract extraction, bilateral.      Social History:  He reports that he has never smoked. He has never used smokeless tobacco. He reports that he does not drink alcohol and does not use drugs.      Family History:  Mother and father both  approximately age 89 he is unsure of their medical history.     Allergies:  Lisinopril and Metformin    Outpatient Medications:  Current Outpatient Medications   Medication Instructions    amLODIPine (Norvasc) 10 mg tablet 1 tablet, Daily    aspirin 81 mg, Daily    atorvastatin (LIPITOR) 10 mg, Daily    carvedilol (COREG) 25 mg, 2 times daily    diazePAM (VALIUM) 2 mg, Every 6 hours PRN    glipiZIDE XL (GLUCOTROL XL) 10 mg, Daily RT    Jardiance 25 mg, Daily with breakfast    latanoprost (Xalatan) 0.005 % ophthalmic solution 1 drop, Nightly    losartan (COZAAR) 100 mg, Daily    moxifloxacin (Vigamox) 0.5 % ophthalmic solution PLEASE SEE ATTACHED FOR DETAILED DIRECTIONS    OneTouch Ultra Test CHECK BLOOD SUGARS DAILY FOR NON INSULIN DEPENDENT DIABETES E11.9    OneTouch Verio Reflect Meter misc USE AS DIRECTED TO TEST BLOOD GLUCOSE    prednisoLONE acetate (Pred-Forte) 1 %  "ophthalmic suspension 1 drop, 3 times daily    sildenafil (REVATIO) 20 mg, As needed        Last Recorded Vitals:  Vitals:    04/24/25 1528   BP: 136/70   Pulse: 69   SpO2: 98%   Weight: 80.3 kg (177 lb)       Physical Exam:  Constitutional:       Appearance: Not in distress.   Eyes:      Conjunctiva/sclera: Conjunctivae normal.   HENT:    Mouth/Throat:      Pharynx: Oropharynx is clear.   Neck:      Vascular: No carotid bruit. JVD normal.   Pulmonary:      Breath sounds: Normal breath sounds. No wheezing. No rales.   Cardiovascular:      Regular rhythm.      Murmurs: There is a grade 1/6 low frequency midsystolic murmur.      No gallop.  No click. No rub.   Abdominal:      Palpations: Abdomen is soft.      Tenderness: There is no abdominal tenderness.   Musculoskeletal:         General: No deformity. Neurological:      General: No focal deficit present.        @PESEC->PESEC(CIRCULAR REFERENCE)@       Last Labs:  CBC -  Lab Results   Component Value Date    WBC 6.9 03/12/2025    HGB 15.0 03/12/2025    HCT 43.0 03/12/2025    MCV 92 03/12/2025     03/12/2025       CMP -  Lab Results   Component Value Date    CALCIUM 10.8 (H) 03/12/2025    PROT 8.2 03/12/2025    ALBUMIN 4.3 03/12/2025    AST 12 03/12/2025    ALT 17 03/12/2025    ALKPHOS 88 03/12/2025    BILITOT 0.8 03/12/2025       LIPID PANEL -   No results found for: \"CHOL\", \"TRIG\", \"HDL\", \"CHHDL\", \"LDLF\", \"VLDL\", \"NHDL\"    RENAL FUNCTION PANEL -   Lab Results   Component Value Date    GLUCOSE 143 (H) 03/12/2025     03/12/2025    K 3.8 03/12/2025     03/12/2025    CO2 28 03/12/2025    ANIONGAP 10 03/12/2025    BUN 23 03/12/2025    CREATININE 1.69 (H) 03/12/2025    CALCIUM 10.8 (H) 03/12/2025    ALBUMIN 4.3 03/12/2025        Lab Results   Component Value Date    HGBA1C 7.1 (H) 04/04/2025       Last Cardiology Tests:  ECG:  ECG 12 lead 03/12/2025      Echo:  No results found for this or any previous visit from the past 1095 days.      Ejection " "Fractions:  No results found for: \"EF\"    Cath:  No results found for this or any previous visit from the past 1095 days.      Stress Test:  No results found for this or any previous visit from the past 1095 days.      Cardiac Imaging:  No results found for this or any previous visit from the past 1095 days.            Assessment/Plan       Mixed hyperlipidemia  LDL 66 on lipid panel done in March 2024.    Primary hypertension  Blood pressure is just mildly elevated today but patient somewhat nervous seeing a new physician.  Will just stressed hygienic measures and make no changes in antihypertensive regimen at this time.    Murmur, heart  Patient with a very low-grade murmur on physical exam.  I will order an echocardiogram to determine the etiology but clinical suspicion is low for significant valvular issue at this point.  Plan will be for the patient to call me the day after the echocardiogram and we will review the results over the phone.     Gorge Farmer, DO  "

## 2025-04-24 NOTE — ASSESSMENT & PLAN NOTE
Blood pressure is just mildly elevated today but patient somewhat nervous seeing a new physician.  Will just stressed hygienic measures and make no changes in antihypertensive regimen at this time.

## 2025-05-09 ENCOUNTER — HOSPITAL ENCOUNTER (OUTPATIENT)
Dept: CARDIOLOGY | Facility: HOSPITAL | Age: 75
Discharge: HOME | End: 2025-05-09
Payer: COMMERCIAL

## 2025-05-09 DIAGNOSIS — R01.1 MURMUR, HEART: ICD-10-CM

## 2025-05-09 PROCEDURE — 93306 TTE W/DOPPLER COMPLETE: CPT

## 2025-05-09 PROCEDURE — 93306 TTE W/DOPPLER COMPLETE: CPT | Performed by: INTERNAL MEDICINE

## 2025-05-10 LAB
AORTIC VALVE MEAN GRADIENT: 3 MMHG
AORTIC VALVE PEAK VELOCITY: 1.1 M/S
AV PEAK GRADIENT: 5 MMHG
AVA (PEAK VEL): 3.02 CM2
AVA (VTI): 3.1 CM2
EJECTION FRACTION APICAL 4 CHAMBER: 62.5
EJECTION FRACTION: 63 %
LEFT ATRIUM VOLUME AREA LENGTH INDEX BSA: 18.3 ML/M2
LEFT VENTRICLE INTERNAL DIMENSION DIASTOLE: 3.9 CM (ref 3.5–6)
LEFT VENTRICULAR OUTFLOW TRACT DIAMETER: 2.1 CM
LV EJECTION FRACTION BIPLANE: 64 %
MITRAL VALVE E/A RATIO: 1.15
RIGHT VENTRICLE FREE WALL PEAK S': 11.7 CM/S
TRICUSPID ANNULAR PLANE SYSTOLIC EXCURSION: 2.6 CM

## 2025-10-10 ENCOUNTER — APPOINTMENT (OUTPATIENT)
Dept: OTOLARYNGOLOGY | Facility: CLINIC | Age: 75
End: 2025-10-10
Payer: COMMERCIAL